# Patient Record
Sex: FEMALE | Race: WHITE | RURAL
[De-identification: names, ages, dates, MRNs, and addresses within clinical notes are randomized per-mention and may not be internally consistent; named-entity substitution may affect disease eponyms.]

---

## 2021-01-05 ENCOUNTER — OFFICE VISIT CONVERTED (OUTPATIENT)
Dept: CARDIOLOGY | Facility: CLINIC | Age: 81
End: 2021-01-05
Attending: INTERNAL MEDICINE

## 2021-05-10 NOTE — H&P
History and Physical      Patient Name: Juanita Shi   Patient ID: [id number]   Sex: Female   YOB: 1940    Primary Care Provider: Loren Santillan MD   Referring Provider: Loren Santillan MD    Visit Date: January 5, 2021    Provider: Tony Aguilar MD   Location: [facility]   Location Address: [address] [place] [address]  [id number]          Chief Complaint  · Shortness of breath   · Edema       History Of Present Illness  Consult requested by: Loren Santillan MD   Juanita Shi is an 80-year-old white female who was seen in the office today. This is a new patient to me. She was previously followed by Dr. Gipson in the past. She has a history of atrial fibrillation, hyperthyroidism, and a chart history of reduced LV function. She was hospitalized in December with shortness of breath and fall at home. She was diagnosed with lower extremity cellulitis, pulmonary edema, and volume overload. She had associated shortness of breath, malaise, and fatigue. She was treated with diuretics and further evaluation showed a cephalic vein superficial thrombosis. Ultrasound was negative for DVT. She had an echocardiogram which showed normal LV systolic function and moderately reduced RV systolic function. Since discharge, she is doing better. Her edema is better than it was in the hospital. She denies palpitations or chest pain.   PAST MEDICAL HISTORY: Obesity; atrial fibrillation, presumed chronic with previous Amiodarone use but was discontinued at least in 2019; hyperthyroidism; lymphedema.   PSYCHOSOCIAL HISTORY: No history of mood change or depression. She is . Daily caffeine use. Denies alcohol or tobacco use.   FAMILY HISTORY: Positive for hypertension and heart disease. Negative for diabetes.   CURRENT MEDICATIONS: Methimazole 5 mg daily; Metoprolol 150 mg b.i.d.; Benzonatate 100 mg every 8 hours; Eliquis 5 mg b.i.d.; Pantoprazole 20 mg daily; Furosemide 20 mg  "1-2 daily p.r.n.; Claritin 10 mg daily; Tylenol Arthritis 1-2 daily; vitamin D 25 mcg daily; vitamin C 500 mg daily; Tums p.r.n.   ALLERGIES: No known drug allergies.       Review of Systems  · Constitutional  o Admits  o : fatigue, recent weight changes   o Denies  o : good general health lately  · HENT  o Denies  o : hearing loss or ringing, chronic sinus problem, swollen glands in neck  · Cardiovascular  o Admits  o : swelling (feet, ankles, hands), shortness of breath while walking or lying flat  o Denies  o : chest pain, palpitations (fast, fluttering, or skipping beats)  · Respiratory  o Admits  o : chronic or frequent cough  o Denies  o : asthma or wheezing, COPD  · Gastrointestinal  o Denies  o : ulcers, nausea or vomiting  · Neurologic  o Denies  o : lightheaded or dizzy, stroke, headaches  · Musculoskeletal  o Admits  o : joint pain  o Denies  o : back pain  · Endocrine  o Admits  o : thyroid disease, heat or cold intolerance  o Denies  o : diabetes, excessive thirst or urination  · Heme-Lymph  o Admits  o : bleeding or bruising tendency  o Denies  o : anemia      Vitals  Date Time BP Position Site L\R Cuff Size HR RR TEMP (F) WT  HT  BMI kg/m2 BSA m2 O2 Sat FR L/min FiO2 HC       01/05/2021 12:57 /68 Sitting    97 - R   222lbs 0oz 5'  7\" 34.77 2.18             Physical Examination  · Constitutional  o Appearance  o : Obese white female, pleasant, in no acute distress.   · Head and Face  o HEENT  o : No pallor, anicteric. Eyes normal. Moist mucous membranes.  · Neck  o Inspection/Palpation  o : Supple. No hepatosplenomegaly.  o Jugular Veins  o : No JVD. No carotid bruits.  · Respiratory  o Auscultation of Lungs  o : Clear to auscultation bilaterally. No crackles or wheezing.  · Cardiovascular  o Heart  o : Irregularly irregular S1, S2.   · Gastrointestinal  o Abdominal Examination  o : Soft, non-distended. No palpable hepatosplenomegaly. Bowel sounds heard in all four " quadrants.  · Musculoskeletal  o General  o : Normal muscle tone and strength.  · Skin and Subcutaneous Tissue  o General Inspection  o : No skin rashes.  · Extremities  o Extremities  o : Warm and well perfused. Distal pulses present. 3+ pitting edema of the lower extremities, currently wrapped with Ace wrap.   · Labs  o Labs  o : Reviewed.      Echocardiogram 12/26 showed normal EF 55%, moderately enlarged right ventricle with low normal RV systolic function, mild mitral and moderate tricuspid regurgitation, biatrial enlargement, mild pulmonary hypertension.     Hospital records were reviewed.           Assessment     1.  Recent diagnosis of volume overload with severe lower extremity edema. Her case is consistent with right        heart failure. Echocardiogram shows enlargement of the right ventricle with low normal RV systolic        function, likely secondary to obesity, possibly some component of diastolic heart failure. Her volume status        has improved since her hospitalization in December. She has chronic lymph which is currently stable and        back to near baseline.   2.  Atrial fibrillation, presumed chronic.  All of her EKGs and telemetry tracing showed atrial fibrillation recently.        She used to see Dr. Gipson and was on Amiodarone up through 2019 which was subsequently        discontinued. At her last office visit in 2019, she was noted to be in atrial fibrillation. Her rate is currently        controlled. She is on appropriate anticoagulation therapy.   3.  Hyperthyroidism, stable currently.          Plan     Regarding her prescription drug management, I think her rate control is adequate to continue the current dose of Metoprolol 150 mg b.i.d.  Given her elevated embolic risk, we will continue anticoagulation lifelong. Her current dose of diuretics appears adequate. Continue to followup with wound care and home health to manage her lymphedema. Otherwise no additional cardiac diagnostics  are needed at this time. I will see her back in the office in about 4 months for followup unless problems arise.      It is a pleasure to assist in her care. Please let me know if you have any questions regarding her case.      NEISHA Aguilar MD   CBD/pap             Electronically Signed by: Shalini Anderson-, Other -Author on January 8, 2021 10:25:10 AM  Electronically Co-signed by: Tony Aguilar MD -Reviewer on January 8, 2021 10:54:52 AM

## 2021-05-14 VITALS
DIASTOLIC BLOOD PRESSURE: 68 MMHG | SYSTOLIC BLOOD PRESSURE: 123 MMHG | WEIGHT: 222 LBS | HEART RATE: 97 BPM | HEIGHT: 67 IN | BODY MASS INDEX: 34.84 KG/M2